# Patient Record
Sex: MALE | Race: WHITE | NOT HISPANIC OR LATINO | URBAN - METROPOLITAN AREA
[De-identification: names, ages, dates, MRNs, and addresses within clinical notes are randomized per-mention and may not be internally consistent; named-entity substitution may affect disease eponyms.]

---

## 2021-10-14 ENCOUNTER — APPOINTMENT (RX ONLY)
Dept: URBAN - METROPOLITAN AREA CLINIC 51 | Facility: CLINIC | Age: 21
Setting detail: DERMATOLOGY
End: 2021-10-14

## 2021-10-14 DIAGNOSIS — L72.8 OTHER FOLLICULAR CYSTS OF THE SKIN AND SUBCUTANEOUS TISSUE: ICD-10-CM | Status: WORSENING

## 2021-10-14 PROCEDURE — ? REFERRAL

## 2021-10-14 PROCEDURE — ? COUNSELING

## 2021-10-14 PROCEDURE — 99202 OFFICE O/P NEW SF 15 MIN: CPT

## 2021-10-14 PROCEDURE — ? DEFER

## 2021-10-14 ASSESSMENT — LOCATION ZONE DERM: LOCATION ZONE: FACE

## 2021-10-14 ASSESSMENT — LOCATION DETAILED DESCRIPTION DERM: LOCATION DETAILED: GLABELLA

## 2021-10-14 ASSESSMENT — LOCATION SIMPLE DESCRIPTION DERM: LOCATION SIMPLE: GLABELLA

## 2021-10-14 NOTE — PROCEDURE: DEFER
Detail Level: Detailed
Instructions (Optional): Patient will need to have cyst removed by either an ENT or plastic surgeon.  Patient would like referral to  and scheduled at [facility].\\nCalled Dr. Ayesha Christianson office and left message to schedule appt. Emilia called back and left message stating that Dr Christianson office requests a fax referral for excision and once reviewing office note, her office will call patient with time and date.\\n\\n**Lesion is 1 cm.
Introduction Text (Please End With A Colon): The following procedure was deferred: